# Patient Record
Sex: MALE | Race: BLACK OR AFRICAN AMERICAN | NOT HISPANIC OR LATINO | Employment: OTHER | URBAN - METROPOLITAN AREA
[De-identification: names, ages, dates, MRNs, and addresses within clinical notes are randomized per-mention and may not be internally consistent; named-entity substitution may affect disease eponyms.]

---

## 2023-03-17 ENCOUNTER — REFERRAL (OUTPATIENT)
Dept: TRANSPLANT | Facility: CLINIC | Age: 58
End: 2023-03-17

## 2023-03-17 DIAGNOSIS — K70.31 ALCOHOLIC CIRRHOSIS OF LIVER WITH ASCITES (H): Primary | ICD-10-CM

## 2023-03-17 NOTE — LETTER
January 30, 2024        Jose Dillon Sr.  [address]  [place] [address]        Dear Mr. Dillon,      We recently received your referral for liver transplant consideration.  You were scheduled for consultation with one of our providers today and were unable to reach you during your scheduled appointment time.        If you are interested in rescheduling an appointment with one of our providers, you can contact the clinic scheduling at [phone].  We will close this referral at this time.  If you have any questions, you can contact me at the number below.  Thank you.          Sincerely,       Danika Segovia, RN, BSN  Pre-Liver Transplant Coordinator  Phone: [phone]       CC:Referring Provider

## 2023-03-17 NOTE — LETTER
April 11, 2023      Jose Dillon Sr.  [address]  [place] [address]          Dear Jose,    Thank you for your interest in the Transplant Center at [facility]. We look forward to being a part of your care team and assisting you through the transplant process.    As we discussed, your transplant coordinator is Caitlin Segovia (Liver).  You may call your coordinator at any time with questions or concerns.  Your first scheduled call will be on April 17, 2023 between 10:00 am and 1:00 pm.  If this needs to change, call [phone].    Please complete the following.    Fill out and return the enclosed forms  Authorization for Electronic Communication  Authorization to Discuss Protected Health Information  Authorization for Release of Protected Health Information    Sign up for:  HipGeot, access to your electronic medical record (see enclosed pamphlet)  Contents FirstplantCrowdbooster, a transplant education website    You can use these tools to learn more about your transplant, communicate with your care team, and track your medical details      Sincerely,      Solid Organ Transplant  [facility]    cc: Referring Physician PCP

## 2023-03-17 NOTE — LETTER
April 11, 2023        Jose Dillon Sr.  [address]  [place] [address]        Dear Jose,    Thank you for your interest in the Transplant Center at [facility]. We look forward to being a part of your care team and assisting you through the transplant process.    As we discussed, your transplant coordinator is Caitlin Segovia (Liver).  You may call your coordinator at any time with questions or concerns.  Your first scheduled call will be on April 17, 2023 between 10:00 am and 1:00 pm.  If this needs to change, call [phone].    Please complete the following.    Fill out and return the enclosed forms  Authorization for Electronic Communication  Authorization to Discuss Protected Health Information  Authorization for Release of Protected Health Information    Sign up for:  Osmosist, access to your electronic medical record (see enclosed pamphlet)  [employer] MEDICAL[employer]mParticle, a transplant education website    You can use these tools to learn more about your transplant, communicate with your care team, and track your medical details      Sincerely,      Solid Organ Transplant  [facility]    cc: Referring Physician PCP

## 2023-03-17 NOTE — LETTER
PHYSICIAN ORDERS      DATE & TIME ISSUED: May 30, 2023 2:02 PM  PATIENT NAME: Jose Dillon Sr.   : 1965     [facility] MR# : [id number]     DIAGNOSIS:  CIrrhosis  ICD-10 CODE: K 70.31  Orders  1 year after issue.    These labs must be drawn all together on the same day when done:   INR   Basic Metabolic Panel   Hepatic Panel        Phosphatidyethanol (PEth)    CBC with platelets     PLEASE FAX RESULTS AS SOON AS POSSIBLE TO [phone], ATTN:LabDE    FOR CLINICAL QUESTIONS, PLEASE CALL Danika Segovia RN, at [phone]    .  Hepatology/Liver Transplant  Medical Director, Liver Transplantation  [facility]

## 2023-03-29 NOTE — TELEPHONE ENCOUNTER
Patient Call: Voicemail  Date/Time: 3/28/2023  [id number]  Reason for call: Pt returning call to Becky to start process

## 2023-04-11 VITALS — HEIGHT: 69 IN | BODY MASS INDEX: 25.77 KG/M2 | WEIGHT: 174 LBS

## 2023-04-11 NOTE — TELEPHONE ENCOUNTER
Patient was asked the following questions during liver intake call.     Referring Provider: STEFANO Nguyễn  Referring Diagnosis: Decompensated alcoholic cirrhosis of liver  PCP: Jeb Lawrence    1)Do you know why you have liver disease: yes       If Alcoholic Cirrhosis is present when was your last drink: 4-5 months ago       Have you ever been through treatment for alcohol: yes, inpatient x 2, can not remember name, was on [address]  2) Presence of Ascites: yes Paracentesis: yes  3) Presence of Hepatic Encephalopathy: yes Medications: yes lactulose, Xifixan  4) History of GI Bleeding: no  5) Oxygen Use: no  6) EGD: In CE  7) Colonoscopy: In CE   8) MELD Na Score: 17   9) Labs available for review from PCP/GI: In CE  10)HCC Diagnosis: no                                  11)Insurance information:   [employer]              Policy Rose: self             Subscriber/Policy/ID Number: [id number]              Group Number: [id number]    Referral intake process completed.  Patient is aware that after financial approval is received, medical records will be requested.   Patient confirmed for a callback from transplant coordinator on April 17, 2023.  Tentative evaluation date TBD.    Confirmed coordinator will discuss evaluation process in more detail at the time of their call.   Patient is aware of the need to arrange age appropriate cancer screening, vaccinations, and dental care.  Reminded patient to complete questionnaire, complete medical records release, and review packet prior to evaluation visit .  Assessed patient for special needs (ie--wheelchair, assistance, guardian, and ):  legally blind, right eye  Patient instructed to call [phone] with questions.     Patient gave verbal consent during intake call to obtain medical records and documents outside of [facility]:  yes

## 2023-04-17 NOTE — TELEPHONE ENCOUNTER
Patient scheduled for intake call between 10a-1p today.  Called at noon, no answer - lvm with my direct number for patient to return call to complete referral call with transplant oordinator.

## 2023-04-25 ENCOUNTER — TELEPHONE (OUTPATIENT)
Dept: TRANSPLANT | Facility: CLINIC | Age: 58
End: 2023-04-25

## 2023-04-25 NOTE — TELEPHONE ENCOUNTER
Jose called, he was inpatient at [facility] Discharged Yesterday 04/24/2023.  He did not have his cell phone with.  He missed his phone call with the care coordinator

## 2023-05-18 ENCOUNTER — TELEPHONE (OUTPATIENT)
Dept: DERMATOLOGY | Facility: CLINIC | Age: 58
End: 2023-05-18
Payer: COMMERCIAL

## 2023-05-18 NOTE — TELEPHONE ENCOUNTER
Writer spoke with patient over the phone, patient stated he was in the hospital and couldn't hear me. Writer informed patient that his Sept 21st appointment will need to be reschedule due to Dr. Nava not being in clinic. Patient stated he can't r/s at this time and wants a call back. (Will call back in 2 business days)

## 2023-05-24 ENCOUNTER — TELEPHONE (OUTPATIENT)
Dept: TRANSPLANT | Facility: CLINIC | Age: 58
End: 2023-05-24
Payer: COMMERCIAL

## 2023-05-24 NOTE — TELEPHONE ENCOUNTER
Transplant Update 05/24/23 :  Topic:  Transplant Referral/Evaluation Status    Called patient to discuss status of referral and/or evaluation for liver transplant.      Coordinator was able to reach the patient: Yes    Plan for follow-up:  Intake call with coordinator on 5-30-23 @1300.  Patient has been hospitalized at [facility] since 5-16-23.  Chart reviewed and discussed with patient, he was admitted with GI bleed and decompensated liver disease.  He expects to be discharged within the next 1-2 days.  He would like to discuss options for care at [facility], with the liver transplant team.  Primary coordinator aware of update.  Patient verbalized understanding of the plan and agrees to call Transplant Coordinator with any further questions or concerns.

## 2023-05-26 ENCOUNTER — TELEPHONE (OUTPATIENT)
Dept: DERMATOLOGY | Facility: CLINIC | Age: 58
End: 2023-05-26
Payer: COMMERCIAL

## 2023-05-26 NOTE — TELEPHONE ENCOUNTER
Lvm my chart message for patient to rescheduled the following    Appointment type: New  Provider:   Rescheduled date: 9-21-23  Specialty phone number: [phone]    Additonal Notes: Provider cancel clinic

## 2023-05-30 NOTE — TELEPHONE ENCOUNTER
"LIVER DISEASE ETOH   REFERRING PROVIDER Luis Dumont  [facility]hon[facility]    PCP Elvis Lawrence   -----------------------------------------------------------------------------------------------------------------------------  MELD 26         ABO A       Recent admission for para, had EGD - hematemesis, melena, UGIB; PERLA, hypona+    - large hiatal hernia    First dx: cirrhosis over 10 years ago from lab work.  Started decompensating last year or two.  GI bleed, ascites    Treatment at [facility], Luis Dumont note notes last ETOH Oct 2022.     Significant BLE edema , having significant effects on mobility    Has CADI coordinator. Trying to find assisted living type situation- currently has 28 stairs and very difficult to getting up and down, difficulty getting even around stud apartment, has been difficult for past few months        Ascites  Antonio Furosemide- currently off due to PERLA; acute on chronic creatinine elevation  Jamar approx once a month, 5 L approx   TIPS no  HE on lactulose and rifaximin     Variceal screening Last EGD recent admission, has upcoming repeat at [facility] pending    Alcohol use    Last ETOH at least 8 months. Prior 1/2-1 pint a day most days, lots of beer. Asked patient about having a beer on his birthday, noted on most recent admission.  \"I forgot about that.  It tasted nasty.  It was only one beer.\"     Was in program with random drug and alcohol testing- was on probation for DWI;  Was in treatment about a year ago through court system.  Just had CD evaluation about a month ago. They recommended nothing.      Discussed need for CD eval and treamtnet  Says he would never go back to certain places \"because they want to keep you inpatient\"      2 past treatment programs- [address], [facility]    ---------------------------------------------------------------------------------------------------------------------------------  PMH  Cardiac- denies  Pulm- denies issues; non smoker  Diabetes " no, but strong family hx  Abdominal surgery -none in abdomen;  small lipomas from neck removed  CRC Screening- done Oct 2022  Vaccinations:      SUKHJINDER  Lives by self.  No family in area, has friends that support    ---------------------------------------------------------------------------------------------------------------------------------  LDLT Discussed    PLAN    Had recent slip with ETOH use, 2 different treatment programs, and court ordered probation and testing after probation on DUI    Probable consult pending updated MELD labs and PEth - seeing PCP and will get labs then figure out timing; likely consults with Hep and MASSIMO CARVRE

## 2023-06-26 NOTE — TELEPHONE ENCOUNTER
Patient with updated lab 6/9/23    MELD 10    Orders placed for hepatology consult- GI  referral.  Should get MELD labs and PEth prior to consult

## 2023-06-27 NOTE — CONFIDENTIAL NOTE
DIAGNOSIS:  Alcoholic cirrhosis of liver with ascites    Appt Date: 07.17.2023   NOTES STATUS DETAILS   OFFICE NOTE from referring provider Internal 03.17.2023 Moira Blackman MD   OFFICE NOTES from other specialists Care Everywhere 06.09.2023 Luis Dumont PA-C [facility]   DISCHARGE SUMMARY from hospital Care Everywhere 02.27.2023 [facility]   MEDICATION LIST Care Everywhere    LIVER BIOSPY (IF APPLICABLE)      PATHOLOGY REPORTS      IMAGING     ENDOSCOPY (IF AVAILABLE) Care Everywhere 05.19.2023   COLONOSCOPY (IF AVAILABLE) Care Everywhere 11.18.2022   ULTRASOUND LIVER Care Everywhere  01.17.2023, 07.22.2022 Ult Abd Complete   CT OF ABDOMEN Care Everywhere 06.28.2023 CT Abd Pelvis   MRI OF LIVER     FIBROSCAN, US ELASTOGRAPHY, FIBROSIS SCAN, MR ELASTOGRAPHY     LABS     HEPATIC PANEL (LIVER PANEL) Care Everywhere 06.09.2023   BASIC METABOLIC PANEL Care Everywhere 06.09.2023   COMPLETE METABOLIC PANEL Care Everywhere 05.25.2023   COMPLETE BLOOD COUNT (CBC) Care Everywhere 05.25.2023   INTERNATIONAL NORMALIZED RATIO (INR) Care Everywhere 06.09.2023   HEPATITIS C ANTIBODY     HEPATITIS C VIRAL LOAD/PCR     HEPATITIS C GENOTYPE     HEPATITIS B SURFACE ANTIGEN     HEPATITIS B SURFACE ANTIBODY     HEPATITIS B DNA QUANT LEVEL     HEPATITIS B CORE ANTIBODY       Action 06.27.2023 QRM   Action Taken Pending images from [facility]     Action 07.10.2023 RM   Action Taken Images received and uploaded to chart.

## 2023-07-11 DIAGNOSIS — K70.31 ALCOHOLIC CIRRHOSIS OF LIVER WITH ASCITES (H): Primary | ICD-10-CM

## 2023-07-17 ENCOUNTER — PRE VISIT (OUTPATIENT)
Dept: GASTROENTEROLOGY | Facility: CLINIC | Age: 58
End: 2023-07-17

## 2023-07-17 ENCOUNTER — OFFICE VISIT (OUTPATIENT)
Dept: GASTROENTEROLOGY | Facility: CLINIC | Age: 58
End: 2023-07-17
Attending: INTERNAL MEDICINE
Payer: COMMERCIAL

## 2023-07-17 ENCOUNTER — LAB (OUTPATIENT)
Dept: LAB | Facility: CLINIC | Age: 58
End: 2023-07-17
Payer: COMMERCIAL

## 2023-07-17 VITALS
WEIGHT: 203.7 LBS | BODY MASS INDEX: 30.08 KG/M2 | SYSTOLIC BLOOD PRESSURE: 138 MMHG | OXYGEN SATURATION: 100 % | HEART RATE: 83 BPM | DIASTOLIC BLOOD PRESSURE: 86 MMHG | TEMPERATURE: 98.1 F

## 2023-07-17 DIAGNOSIS — K70.31 ALCOHOLIC CIRRHOSIS OF LIVER WITH ASCITES (H): Primary | ICD-10-CM

## 2023-07-17 DIAGNOSIS — K70.31 ALCOHOLIC CIRRHOSIS OF LIVER WITH ASCITES (H): ICD-10-CM

## 2023-07-17 LAB
ALBUMIN SERPL BCG-MCNC: 3.1 G/DL (ref 3.5–5.2)
ALP SERPL-CCNC: 142 U/L (ref 40–129)
ALT SERPL W P-5'-P-CCNC: 29 U/L (ref 0–70)
ANION GAP SERPL CALCULATED.3IONS-SCNC: 8 MMOL/L (ref 7–15)
AST SERPL W P-5'-P-CCNC: 62 U/L (ref 0–45)
BILIRUB DIRECT SERPL-MCNC: 0.64 MG/DL (ref 0–0.3)
BILIRUB SERPL-MCNC: 1.1 MG/DL
BUN SERPL-MCNC: 23 MG/DL (ref 6–20)
CALCIUM SERPL-MCNC: 8.4 MG/DL (ref 8.6–10)
CHLORIDE SERPL-SCNC: 107 MMOL/L (ref 98–107)
CREAT SERPL-MCNC: 1.01 MG/DL (ref 0.67–1.17)
DEPRECATED HCO3 PLAS-SCNC: 25 MMOL/L (ref 22–29)
ERYTHROCYTE [DISTWIDTH] IN BLOOD BY AUTOMATED COUNT: 18.5 % (ref 10–15)
GFR SERPL CREATININE-BSD FRML MDRD: 87 ML/MIN/1.73M2
GLUCOSE SERPL-MCNC: 100 MG/DL (ref 70–99)
HBV CORE AB SERPL QL IA: NONREACTIVE
HCT VFR BLD AUTO: 25 % (ref 40–53)
HGB BLD-MCNC: 8.1 G/DL (ref 13.3–17.7)
INR PPP: 1.35 (ref 0.85–1.15)
MCH RBC QN AUTO: 32.5 PG (ref 26.5–33)
MCHC RBC AUTO-ENTMCNC: 32.4 G/DL (ref 31.5–36.5)
MCV RBC AUTO: 100 FL (ref 78–100)
PLATELET # BLD AUTO: 140 10E3/UL (ref 150–450)
POTASSIUM SERPL-SCNC: 4.8 MMOL/L (ref 3.4–5.3)
PROT SERPL-MCNC: 5.6 G/DL (ref 6.4–8.3)
RBC # BLD AUTO: 2.49 10E6/UL (ref 4.4–5.9)
SODIUM SERPL-SCNC: 140 MMOL/L (ref 136–145)
WBC # BLD AUTO: 5.3 10E3/UL (ref 4–11)

## 2023-07-17 PROCEDURE — 82248 BILIRUBIN DIRECT: CPT | Performed by: PATHOLOGY

## 2023-07-17 PROCEDURE — 99205 OFFICE O/P NEW HI 60 MIN: CPT | Performed by: PHYSICIAN ASSISTANT

## 2023-07-17 PROCEDURE — 36415 COLL VENOUS BLD VENIPUNCTURE: CPT | Performed by: PATHOLOGY

## 2023-07-17 PROCEDURE — 86704 HEP B CORE ANTIBODY TOTAL: CPT | Performed by: PHYSICIAN ASSISTANT

## 2023-07-17 PROCEDURE — G0463 HOSPITAL OUTPT CLINIC VISIT: HCPCS | Performed by: PHYSICIAN ASSISTANT

## 2023-07-17 PROCEDURE — 85027 COMPLETE CBC AUTOMATED: CPT | Performed by: PATHOLOGY

## 2023-07-17 PROCEDURE — 99000 SPECIMEN HANDLING OFFICE-LAB: CPT | Performed by: PATHOLOGY

## 2023-07-17 PROCEDURE — 80053 COMPREHEN METABOLIC PANEL: CPT | Performed by: PATHOLOGY

## 2023-07-17 PROCEDURE — 85610 PROTHROMBIN TIME: CPT | Performed by: PATHOLOGY

## 2023-07-17 RX ORDER — LANOLIN ALCOHOL/MO/W.PET/CERES
100 CREAM (GRAM) TOPICAL DAILY
COMMUNITY

## 2023-07-17 RX ORDER — FLUTICASONE PROPIONATE 50 MCG
1 SPRAY, SUSPENSION (ML) NASAL DAILY
COMMUNITY

## 2023-07-17 RX ORDER — LACTULOSE 10 G/15ML
20 SOLUTION ORAL 3 TIMES DAILY
COMMUNITY

## 2023-07-17 RX ORDER — FOLIC ACID 1 MG/1
1 TABLET ORAL DAILY
COMMUNITY

## 2023-07-17 RX ORDER — CHOLECALCIFEROL (VITAMIN D3) 50 MCG
1 TABLET ORAL DAILY
COMMUNITY

## 2023-07-17 ASSESSMENT — PAIN SCALES - GENERAL: PAINLEVEL: EXTREME PAIN (9)

## 2023-07-17 NOTE — LETTER
7/17/2023         RE: Jose Dillon Sr.  [address]  [place] [address]        Dear Colleague,    Thank you for referring your patient, Jose Dillon Sr., to the [facility] [place]. Please see a copy of my visit note below.    Hepatology Clinic note  Jose Dillon Sr.   Date of Birth 1965  Date of Service 7/16/2023    REASON FOR CONSULTATION: ETOH cirrhosis   REFERRING PROVIDER: Luis Dumont         Assessment/plan:   Jose Dillon Sr. is a 58 year old male with EtOH cirrhosis complicated by history of esophageal varices, hepatic encephalopathy well-controlled with current regimen and ascites requiring paracentesis monthly. Last ETOH use was around April 2023.     Over the past month, his liver and kidney function have improved quite a bit. MELD 3.0 today is 10 (MELD-Na June was 25). Today we discussed natural history of cirrhosis and complications of the disease as well as indications for liver transplantation.  Given improvement in his liver function, discussed that liver transplantation evaluation is not indicated at this time. For recurrent ascites, we briefly discussed TIPs procedure if he continues to require paracentesis. He will continue routine follow up with liver provider.  He is up-to-date for variceal and HCC screening.    #History of ascites:  - Continue paracentesis. Highly recommended going in for weekly paracentesis.  - If he continues to have regular paracentesis, discussed risk/benefits of TIPS procedure.   - Will defer restarting diuretics [facility] liver provider.  Will defer restarting diuretics .     # HCC Screening:   - Up to date    # Variceal screening:   - Up to date    # Continue planned follow up with [facility] Hepatology provider   # Follow up with [facility] Hepatology as needed in future if liver function worsens       Mary Dubon PA-C   [facility]     Total time for  E/M services performed on the date of the encounter 60 minutes.  This included review of previous: clinic visits, hospital records, lab results, imaging studies, and procedural documentation. Time also includes patient visit, documentation and discussion with other providers.  The findings from this review are summarized in the above note.   ----------------------------------------------------       HPI:   Jose Dillon Sr. is a 58 year old male  presenting for the evaluation of elevated LFT's.     ETOH cirrhosis   Complicated by:   - Ascites  - HE   EGD: 6/30/2023    Patient was diagnosed with cirrhosis 2011. He has been closely by [facility] hepatology over the past year and a half, last saw Luis Dumont June 2023.  He was hospitalized in April 2023 for acute kidney injury, mild hyponatremia.  He has been off diuretics months.    First paracentesis was about April 2022.  Over the past few months, he has been having beverly about once a month, last paracentesis was 11 L out.  He has been recommended to have weekly paracentesis, but states he could not get in the habit of doing it weekly.     Appetite is very good. Weight 190 - 217 lbs. Limiting processed foods following low-sodium diet.     Using a cane to help ambulate. History of chronic pain. Burning/ right leg radiation- gabapentin. Happens all day every day    Hospitalized in February 20 23-year for hepatic encephalopathy at which time he is not taking lactulose and also notes that he was dehydrated and not eating well.  Currently he is taking lactulose - 30 mL - have a good 3 bowel movements.  No problems with confusion if he takes lactulose regularly.  Also taking rifaximin 550 twice daily.    Patient also denies melena, hematochezia or hematemesis. Patient denies , fevers, sweats or chills.    PMH:   Madelung's disease (Launois-Bensaude disease, multiple symmetrical lipomatosis, ETOH cirrhosis     SMH:    has no past surgical history on file.      Medications:   This patient does not have an active medication from one of the medication groupers.     No smoking. Last drank alcohol a few months (around birthday/ ). A few pints a week. Cannabis - pain. Did have a CD assessment within the past few months. He has done a few court ordered CD treatment in the past. Last CD treatment 2-3 years ago. No previous IV/IN drug use. Not currently. Patient currently lives by self. Possible get into a 1st floor Apt. Brother with alcohol use disorder.     Previous work-up:  Hepatitis A IgG nonreactive  Hepatitis B surface antigen nonreactive  Hepatitis B surface antibody reactive  Hepatitis C antibody nonreactive   HFE mutation -all negative  Ferritin 540  Iron sat percentage 48%  Smooth muscle antibody 14-normal  Mitochondrial antibody 2  A1AT - 122  IGG - 1703  Ceruloplasmin 25.7 -        Allergies:     Allergies   Allergen Reactions    Ace Inhibitors Cough     Other reaction(s): Cough  Dry cough that resolved with stopping lisinopril              Social History:     Social History     Socioeconomic History    Marital status: Single     Spouse name: Not on file    Number of children: Not on file    Years of education: Not on file    Highest education level: Not on file   Occupational History    Not on file   Tobacco Use    Smoking status: Never    Smokeless tobacco: Never   Substance and Sexual Activity    Alcohol use: Not Currently     Comment: quit ETOH 4-5 months ago    Drug use: Never    Sexual activity: Not on file   Other Topics Concern    Not on file   Social History Narrative    Not on file     Social Determinants of Health     Financial Resource Strain: Not on file   Food Insecurity: Not on file   Transportation Needs: Not on file   Physical Activity: Not on file   Stress: Not on file   Social Connections: Not on file   Intimate Partner Violence: Not on file   Housing Stability: Not on file            Family History:   No family history on file.         Review  of Systems:   Gen: See HPI     HEENT: No change in vision or hearing, mouth sores, dysphagia, lymph nodes  Resp: No shortness of breath, coughing, hx of asthma  CV: No chest pain, palpitations, syncope   GI: See HPI  : No dysuria, history of stones, urine color    Skin: No rash; no pruritus or psoriasis  MS: No arthralgias, myalgias, joint swelling  Neuro: No memory changes, confusion, numbness    Heme: No difficulty clotting, bruising, bleeding  Psych:  No anxiety, depression, agitation          Physical Exam:   /86 (BP Location: Left arm, Patient Position: Sitting, Cuff Size: Adult Regular)   Pulse 83   Temp 98.1  F (36.7  C) (Oral)   Wt 92.4 kg (203 lb 11.2 oz)   SpO2 100%   BMI 30.08 kg/m      Gen: A&Ox3, NAD, peripheal muscle wasting  HEENT: non-icteric  CV: RRR, no overt murmurs  Lung: CTA Bilatererally, no wheezing or crackles.   Lym- no palpable lymphadenopathy  Abd: soft, NT, ND, moderate ascites  Ext: no edema, intact pulses.   Skin: No rash, no palmar erythema, telangiectasias or jaundice  Neuro: grossly intact, no asterixis   Psych: appropriate mood and affects         Data:   Reviewed in person and significant for:    Last Comprehensive Metabolic Panel:  Lab Results   Component Value Date     07/17/2023    POTASSIUM 4.8 07/17/2023    CHLORIDE 107 07/17/2023    CO2 25 07/17/2023    ANIONGAP 8 07/17/2023     (H) 07/17/2023    BUN 23.0 (H) 07/17/2023    CR 1.01 07/17/2023    GFRESTIMATED 87 07/17/2023    GAVIN 8.4 (L) 07/17/2023     Lab Results   Component Value Date    AST 62 07/17/2023     Lab Results   Component Value Date    ALT 29 07/17/2023     No results found for: BILICONJ   Lab Results   Component Value Date    BILITOTAL 1.1 07/17/2023     Lab Results   Component Value Date    ALBUMIN 3.1 07/17/2023     Lab Results   Component Value Date    PROTTOTAL 5.6 07/17/2023      Lab Results   Component Value Date    ALKPHOS 142 07/17/2023     Lab Results   Component Value Date     WBC 5.3 07/17/2023     Lab Results   Component Value Date    RBC 2.49 07/17/2023     Lab Results   Component Value Date    HGB 8.1 07/17/2023     Lab Results   Component Value Date    HCT 25.0 07/17/2023     No components found for: MCT  Lab Results   Component Value Date     07/17/2023     Lab Results   Component Value Date    MCH 32.5 07/17/2023     Lab Results   Component Value Date    MCHC 32.4 07/17/2023     Lab Results   Component Value Date    RDW 18.5 07/17/2023     Lab Results   Component Value Date     07/17/2023           Imaging:      Exam: Abdomen/pelvis CT with contrast 6/29/2023 12:04 AM     Indication: painful abdominal hernia, hx cirrhosis with bloody vomit       Comparison: Several prior CTs, most recent dated 4/20/2023. Several prior abdominal ultrasounds, most recent dated 1/17/2023.     Technique: Volumetric helical acquisition of CT images from the lung bases through the symphysis pubis with administration of intravenous contrast. Images were reconstructed in axial, coronal, and sagittal planes and reviewed in lung, bone, and soft tissue windows.     Dose: Total DLP = 893 mGy.cm.       Findings:     Abdomen greatly distended by ascites. Large esophageal varices.     Liver: Cirrhosis. No focal hepatic lesion.     Gallbladder and biliary tree: small layering gallstones. No intra- or extrahepatic biliary ductal dilation.     Pancreas: Atrophic.     Spleen: Within normal limits.     Adrenals: Within normal limits.     Kidneys, ureters and bladder: Within normal limits.     Reproductive organs: Normal prostate.     Bowel: Moderate sliding hiatal hernia with surrounding paraesophageal varices. No convincing active extravasation of contrast. Normal caliber small bowel and large bowel. Mild thickening of the right hemicolon likely due to cirrhotic colopathy.     Lymph nodes: Mild inguinal adenopathy is likely reactive.     Peritoneum: Large volume ascites. Mild hazy attenuation of the  mesentery, likely edematous in nature.     Vessels: Aorta and major branches are patent without aneurysm or significant stenosis. Portal vein and superior mesenteric vein are patent. Aortoiliac calcifications. Recannulized paraumbilical vein.     Bones/soft tissues: No acute or suspicious osseous abnormality. Umbilical hernia contains fat and ascites. Anasarca.     Lung bases: Coronary artery atherosclerosis. Lungs are clear.      Again, thank you for allowing me to participate in the care of your patient.        Sincerely,        Mary Dubon PA-C

## 2023-07-17 NOTE — PROGRESS NOTES
Hepatology Clinic note  Jose Dillon Sr.   Date of Birth 1965  Date of Service 7/16/2023    REASON FOR CONSULTATION: ETOH cirrhosis   REFERRING PROVIDER: Luis Dumont         Assessment/plan:   Jose Dillon Sr. is a 58 year old male with EtOH cirrhosis complicated by history of esophageal varices, hepatic encephalopathy well-controlled with current regimen and ascites requiring paracentesis monthly. Last ETOH use was around April 2023.     Over the past month, his liver and kidney function have improved quite a bit. MELD 3.0 today is 10 (MELD-Na June was 25). Today we discussed natural history of cirrhosis and complications of the disease as well as indications for liver transplantation.  Given improvement in his liver function, discussed that liver transplantation evaluation is not indicated at this time. For recurrent ascites, we briefly discussed TIPs procedure if he continues to require paracentesis. He will continue routine follow up with liver provider.  He is up-to-date for variceal and HCC screening.    #History of ascites:  - Continue paracentesis. Highly recommended going in for weekly paracentesis.  - If he continues to have regular paracentesis, discussed risk/benefits of TIPS procedure.   - Will defer restarting diuretics [facility] liver provider.  Will defer restarting diuretics .     # HCC Screening:   - Up to date    # Variceal screening:   - Up to date    # Continue planned follow up with [facility] Hepatology provider   # Follow up with [facility] Hepatology as needed in future if liver function worsens       Mary Dubon PA-C   [facility] Hepatology     Total time for E/M services performed on the date of the encounter 60 minutes.  This included review of previous: clinic visits, hospital records, lab results, imaging studies, and procedural documentation. Time also includes patient visit, documentation and discussion with other providers.  The findings from  this review are summarized in the above note.   ----------------------------------------------------       HPI:   Joes Dillon Sr. is a 58 year old male  presenting for the evaluation of elevated LFT's.     ETOH cirrhosis   Complicated by:   - Ascites  - HE   EGD: 6/30/2023    Patient was diagnosed with cirrhosis 2011. He has been closely by [facility] over the past year and a half, last saw Luis Dumont June 2023.  He was hospitalized in April 2023 for acute kidney injury, mild hyponatremia.  He has been off diuretics months.    First paracentesis was about April 2022.  Over the past few months, he has been having beverly about once a month, last paracentesis was 11 L out.  He has been recommended to have weekly paracentesis, but states he could not get in the habit of doing it weekly.     Appetite is very good. Weight 190 - 217 lbs. Limiting processed foods following low-sodium diet.     Using a cane to help ambulate. History of chronic pain. Burning/ right leg radiation- gabapentin. Happens all day every day    Hospitalized in February 20 23-year for hepatic encephalopathy at which time he is not taking lactulose and also notes that he was dehydrated and not eating well.  Currently he is taking lactulose - 30 mL - have a good 3 bowel movements.  No problems with confusion if he takes lactulose regularly.  Also taking rifaximin 550 twice daily.    Patient also denies melena, hematochezia or hematemesis. Patient denies , fevers, sweats or chills.    PMH:   Madelung's disease (Launois-Bensaude disease, multiple symmetrical lipomatosis, ETOH cirrhosis     SMH:    has no past surgical history on file.     Medications:   This patient does not have an active medication from one of the medication groupers.     No smoking. Last drank alcohol a few months (around birthday/ ). A few pints a week. Cannabis - pain. Did have a CD assessment within the past few months. He has done a few court ordered CD treatment in  the past. Last CD treatment 2-3 years ago. No previous IV/IN drug use. Not currently. Patient currently lives by self. Possible get into a 1st floor Apt. Brother with alcohol use disorder.     Previous work-up:  Hepatitis A IgG nonreactive  Hepatitis B surface antigen nonreactive  Hepatitis B surface antibody reactive  Hepatitis C antibody nonreactive   HFE mutation -all negative  Ferritin 540  Iron sat percentage 48%  Smooth muscle antibody 14-normal  Mitochondrial antibody 2  A1AT - 122  IGG - 1703  Ceruloplasmin 25.7 -        Allergies:     Allergies   Allergen Reactions    Ace Inhibitors Cough     Other reaction(s): Cough  Dry cough that resolved with stopping lisinopril              Social History:     Social History     Socioeconomic History    Marital status: Single     Spouse name: Not on file    Number of children: Not on file    Years of education: Not on file    Highest education level: Not on file   Occupational History    Not on file   Tobacco Use    Smoking status: Never    Smokeless tobacco: Never   Substance and Sexual Activity    Alcohol use: Not Currently     Comment: quit ETOH 4-5 months ago    Drug use: Never    Sexual activity: Not on file   Other Topics Concern    Not on file   Social History Narrative    Not on file     Social Determinants of Health     Financial Resource Strain: Not on file   Food Insecurity: Not on file   Transportation Needs: Not on file   Physical Activity: Not on file   Stress: Not on file   Social Connections: Not on file   Intimate Partner Violence: Not on file   Housing Stability: Not on file            Family History:   No family history on file.         Review of Systems:   Gen: See HPI     HEENT: No change in vision or hearing, mouth sores, dysphagia, lymph nodes  Resp: No shortness of breath, coughing, hx of asthma  CV: No chest pain, palpitations, syncope   GI: See HPI  : No dysuria, history of stones, urine color    Skin: No rash; no pruritus or psoriasis  MS:  No arthralgias, myalgias, joint swelling  Neuro: No memory changes, confusion, numbness    Heme: No difficulty clotting, bruising, bleeding  Psych:  No anxiety, depression, agitation          Physical Exam:   /86 (BP Location: Left arm, Patient Position: Sitting, Cuff Size: Adult Regular)   Pulse 83   Temp 98.1  F (36.7  C) (Oral)   Wt 92.4 kg (203 lb 11.2 oz)   SpO2 100%   BMI 30.08 kg/m      Gen: A&Ox3, NAD, peripheal muscle wasting  HEENT: non-icteric  CV: RRR, no overt murmurs  Lung: CTA Bilatererally, no wheezing or crackles.   Lym- no palpable lymphadenopathy  Abd: soft, NT, ND, moderate ascites  Ext: no edema, intact pulses.   Skin: No rash, no palmar erythema, telangiectasias or jaundice  Neuro: grossly intact, no asterixis   Psych: appropriate mood and affects         Data:   Reviewed in person and significant for:    Last Comprehensive Metabolic Panel:  Lab Results   Component Value Date     07/17/2023    POTASSIUM 4.8 07/17/2023    CHLORIDE 107 07/17/2023    CO2 25 07/17/2023    ANIONGAP 8 07/17/2023     (H) 07/17/2023    BUN 23.0 (H) 07/17/2023    CR 1.01 07/17/2023    GFRESTIMATED 87 07/17/2023    GAVIN 8.4 (L) 07/17/2023     Lab Results   Component Value Date    AST 62 07/17/2023     Lab Results   Component Value Date    ALT 29 07/17/2023     No results found for: BILICONJ   Lab Results   Component Value Date    BILITOTAL 1.1 07/17/2023     Lab Results   Component Value Date    ALBUMIN 3.1 07/17/2023     Lab Results   Component Value Date    PROTTOTAL 5.6 07/17/2023      Lab Results   Component Value Date    ALKPHOS 142 07/17/2023     Lab Results   Component Value Date    WBC 5.3 07/17/2023     Lab Results   Component Value Date    RBC 2.49 07/17/2023     Lab Results   Component Value Date    HGB 8.1 07/17/2023     Lab Results   Component Value Date    HCT 25.0 07/17/2023     No components found for: MCT  Lab Results   Component Value Date     07/17/2023     Lab Results    Component Value Date    MCH 32.5 07/17/2023     Lab Results   Component Value Date    MCHC 32.4 07/17/2023     Lab Results   Component Value Date    RDW 18.5 07/17/2023     Lab Results   Component Value Date     07/17/2023           Imaging:      Exam: Abdomen/pelvis CT with contrast 6/29/2023 12:04 AM     Indication: painful abdominal hernia, hx cirrhosis with bloody vomit       Comparison: Several prior CTs, most recent dated 4/20/2023. Several prior abdominal ultrasounds, most recent dated 1/17/2023.     Technique: Volumetric helical acquisition of CT images from the lung bases through the symphysis pubis with administration of intravenous contrast. Images were reconstructed in axial, coronal, and sagittal planes and reviewed in lung, bone, and soft tissue windows.     Dose: Total DLP = 893 mGy.cm.       Findings:     Abdomen greatly distended by ascites. Large esophageal varices.     Liver: Cirrhosis. No focal hepatic lesion.     Gallbladder and biliary tree: small layering gallstones. No intra- or extrahepatic biliary ductal dilation.     Pancreas: Atrophic.     Spleen: Within normal limits.     Adrenals: Within normal limits.     Kidneys, ureters and bladder: Within normal limits.     Reproductive organs: Normal prostate.     Bowel: Moderate sliding hiatal hernia with surrounding paraesophageal varices. No convincing active extravasation of contrast. Normal caliber small bowel and large bowel. Mild thickening of the right hemicolon likely due to cirrhotic colopathy.     Lymph nodes: Mild inguinal adenopathy is likely reactive.     Peritoneum: Large volume ascites. Mild hazy attenuation of the mesentery, likely edematous in nature.     Vessels: Aorta and major branches are patent without aneurysm or significant stenosis. Portal vein and superior mesenteric vein are patent. Aortoiliac calcifications. Recannulized paraumbilical vein.     Bones/soft tissues: No acute or suspicious osseous abnormality.  Umbilical hernia contains fat and ascites. Anasarca.     Lung bases: Coronary artery atherosclerosis. Lungs are clear.

## 2023-07-17 NOTE — NURSING NOTE
Chief Complaint   Patient presents with     Consult    /86 (BP Location: Left arm, Patient Position: Sitting, Cuff Size: Adult Regular)   Pulse 83   Temp 98.1  F (36.7  C) (Oral)   Wt 92.4 kg (203 lb 11.2 oz)   SpO2 100%   BMI 30.08 kg/m

## 2023-12-12 ENCOUNTER — DOCUMENTATION ONLY (OUTPATIENT)
Dept: TRANSPLANT | Facility: CLINIC | Age: 58
End: 2023-12-12
Payer: COMMERCIAL

## 2023-12-12 ENCOUNTER — REFERRAL (OUTPATIENT)
Dept: TRANSPLANT | Facility: CLINIC | Age: 58
End: 2023-12-12

## 2023-12-12 VITALS — HEIGHT: 69 IN | WEIGHT: 178.8 LBS | BODY MASS INDEX: 26.48 KG/M2

## 2023-12-12 DIAGNOSIS — K70.31 ALCOHOLIC CIRRHOSIS OF LIVER WITH ASCITES (H): Primary | ICD-10-CM

## 2023-12-12 NOTE — LETTER
PHYSICIAN ORDERS      DATE & TIME ISSUED: December 15, 2023 10:59 AM  PATIENT NAME: Jose Dillon Sr.   : 1965     [facility] MR# : [id number]     DIAGNOSIS:  Cirrhosis   ICD-10 CODE: K70.31  Orders  1 year after issue.    Please have the following labs done when at facility for paracentesis. These labs must be drawn all together on the same day when done:   INR   CMP        Phosphatidyethanol (PEth)      PLEASE FAX RESULTS AS SOON AS POSSIBLE TO [phone], ATTN:LabDE    FOR CLINICAL QUESTIONS, PLEASE CALL Danika Segovia, [phone]    .  Hepatology/Liver Transplant  Medical Director, Liver Transplantation  [facility]

## 2023-12-12 NOTE — Clinical Note
Need to open new referral for him, please proceed with intake.  Luis Dumont at [facility] is referring Thanks

## 2023-12-12 NOTE — TELEPHONE ENCOUNTER
Patient was asked the following questions during liver intake call.      Referring Provider: Dr. Luis Dumont  Referring Diagnosis: Alcoholic Cirrhosis  PCP:  Jeb Lawrence     1)Do you know why you have liver disease: Yes, Alcohol      If Alcoholic Cirrhosis is present when was your last drink:  Pt reported approximately Nov 2022      Have you ever been through treatment for alcohol: court ordered tx x2  2) Presence of Ascites: Yes Paracentesis: Yes  3) Presence of Hepatic Encephalopathy: Yes  Medications: Lactulose daily  4) History of GI Bleeding: yes  5) Oxygen Use: NA  6) EGD: 8/9/23, 6/30/23  7) Colonoscopy: In Care Everywhere 11/18/22  8) MELD Score: 18  9)  Labs available for review from PCP/GI:  In Care Everywhere  10)HCC Diagnosis: No                                         11)Insurance information:              Policy Rose: [employer]             Subscriber/Policy/ID Number: [id number]              Group Number: [id number]     Pt reports having a couple rotten teeth. Recommended pt schedule a dental appointment as soon as possible to start treatment recommendations.      Pt strongly declined mychart set-up.    Referral intake process completed.  Patient is aware that after financial approval is received, medical records will be requested.   Patient confirmed for a callback from transplant coordinator on 12/15/23.  Tentative evaluation date TBD.     Confirmed coordinator will discuss evaluation process in more detail at the time of their call.   Patient is aware of the need to arrange age appropriate cancer screening, vaccinations, and dental care.  Reminded patient to complete questionnaire, complete medical records release, and review packet prior to evaluation visit .  Assessed patient for special needs (ie--wheelchair, assistance, guardian, and ):     Patient instructed to call [phone] with questions. yes     Patient gave verbal consent during intake call to obtain medical records and  documents outside of [facility]:   yes

## 2023-12-12 NOTE — LETTER
Jose Dillon Sr.  [address]  [place] [address]          Dear Jose,    Thank you for your interest in the Transplant Center at [facility]. We look forward to being a part of your care team and assisting you through the transplant process.    As we discussed, your transplant coordinator is Caitlin Segovia (Liver).  You may call your coordinator at any time with questions or concerns.  Your first scheduled call will be on 12/15/23 between 9:30am - 11:30am. If this needs to change, call [phone].    Please complete the following.    Fill out and return the enclosed forms  Authorization for Electronic Communication  Authorization to Discuss Protected Health Information  Authorization for Release of Protected Health Information    Sign up for:  [employer], access to your electronic medical record (see enclosed pamphlet)  [employer], a transplant education website                                                             My Transplant Place     You can use these tools to learn more about your transplant, communicate with your care team, and track your medical details      Sincerely,      Solid Organ Transplant  [facility]    cc: Referring Physician PCP

## 2023-12-12 NOTE — PROGRESS NOTES
Call from Tamanna at [facility]. Patient previously seen in consult, referral closed due to MELD of 10.  However, patient developed SBP, MELD has now been 18-22 and Luis Dumnot would like to re-refer for transplant evaluation.  Order sent to intake

## 2023-12-13 ENCOUNTER — TELEPHONE (OUTPATIENT)
Dept: TRANSPLANT | Facility: CLINIC | Age: 58
End: 2023-12-13
Payer: COMMERCIAL

## 2023-12-15 NOTE — TELEPHONE ENCOUNTER
Seen here after referral in May/Rocio- see consult note with Mary and prior referral intake with me June 2023    Past GI bleed hx in May, hematemesis;  In ed last night, Hgb 6/7 in clinic and sent to ED,  but repeat was 7.1 so did not get transfused    Last EGD: 8/9/23    Recent admission for SBP, MELD was up to 18, now back down to 14 on yesterday's labs.       Last ETOH- says he can't pinpoint last once  Says no desire to drink alcohol.  Asked further, thinks maybe March last year.  Reminded him that he reported drinking a beer on his birthday May 2022 - noted during last referral call.  He said it tasted nasty then threw it out and none since.      Reports prior drank at home 1- 2 x week hard alcohol plus beer.     Denies all other drug use including marijuana; positive THC noted April 2021    Tim had advised him to stop drinking totally in past that he would die if he had any ETOH at all.  None since, with exception of beer on birthday May 2023    Issues with hernia concerning to him    Fluid had slowed down, but now frequent beverly again recently and with SBP episode      Declined para and CT in ED yesterday.      PLAN:  MELD < 15 now, will have him get labs again next week to determine if follow up vs eval based on results.  Will get labs when he is there for para next week and determine next steps from there.  Patient will call me 1-2 days after labs drawn and we will determine follow up plan

## 2023-12-30 NOTE — TELEPHONE ENCOUNTER
Most recent MELD =  12 on 12/29/23    PEth done 12/19/23 = 11    Given low MELD, will place order for Hepatology consult.  To be seen by MD this consult, not by mid level given need for determination on transplant candidacy and potential/need for hernia repair    Should have repeat labs (CMP, INR, PEth, CBC) prior to consult, should be in person    Also CD eval, SW consult , orders placed

## 2024-01-15 ENCOUNTER — VIRTUAL VISIT (OUTPATIENT)
Dept: TRANSPLANT | Facility: CLINIC | Age: 59
End: 2024-01-15
Attending: INTERNAL MEDICINE
Payer: COMMERCIAL

## 2024-01-15 DIAGNOSIS — K70.31 ALCOHOLIC CIRRHOSIS OF LIVER WITH ASCITES (H): ICD-10-CM

## 2024-01-15 NOTE — LETTER
1/15/2024         RE: Jose Dillon Sr.  [address]  [place] [address]        Dear Colleague,    Thank you for referring your patient, Jose Dillon Sr., to the [facility]ANSPL[facility]. Please see a copy of my visit note below.    Psychosocial Assessment - Consultation  Jose Dillon Sr. Participated in a telephone consultation for potential liver transplantation.   Living Situation: Jose lives alone in a studio apartment in [place], [place]. He's resided there for the past four years. He indicated that they have three flights of stairs to enter their apartment. He has his friend, Yoly assist him in the apartment.   Education/Employment:  Jose completed two years of vocational school. He last worked about 15 years ago. He is not longer working and is considered disabled.  Jose is not a .   Financial /Income: Jose receives supplemental security income. He does not have any financial concerns.   Health Insurance:  Jose has [employer] MA. This writer talked with Jose about the financial risks of transplant, particularly about the high cost of transplant related medications and the importance of maintaining adequate health insurance coverage.  Family/Social Support: Jose is not  and has one son, Jose who lives in [place]. He has two living sisters, Ayleen and Becky who live in [place]. His father Darryl lives in [place]. He reports a good relationship with his siblings and family, however they would not be able to be involved with his care post transplantation. Jose has a friend Yoly that assists with all his IADLS. She is in the process of becoming his official PCA.   His friend, Yoly would be his primary caregiver post transplantation. She was involved with the call, but further education should be provided if patient continues to transplant evaluation.   This writer stressed the importance of having a stable and involved support network  "before and after transplant.  Provided Jose  with education about the relationship between a stable support system and better surgical and post-transplant outcomes compared to patients with a limited support system.    Functional Status: Jose uses a cane to ambulate. His friend, Yoly assists with all other IADLs. He is not driving due to his DUIs. Both him and Yoly manage his medications.   Chemical Dependency:  Jose reports that his last consumption of alcohol was about a year ago, however there is reports of him taking a sip of alcohol on his birthday in May of 2023. He reports that prior to abstaining from alcohol, he was consuming a six pack of beer every four days. Per clinical, there are reports of him reporting six beers per day. He does not report a history of tobacco use, or misuse/over use of pain medication. Jose did not disclose any illicit substances, however per clinical he had reported some marijuana in the past.   Jose has a history of a \"few\" outpatient treatment programs. He could not provide a specific number. His treatment program ranged from 4-8 years ago. He also he a history of four DUIs, ranging from 1004-6234.   Today, Jose reported that he was diagnosed with liver disease one year ago, however per clinical it appears that he was diagnosed in 2011. At that time, he had a hospital admission for alcohol use and was provided chemical dependency resources. It is clearly documented that he was advised to stop drinking in 2011.   Mental Health: Jose does not report a history of any mental health concerns. He does not report any psychotherapy, psychiatric hospitalizations or suicidal thoughts.   Adjustment to Illness:  This writer provided Jose  with supportive counseling throughout this interview.    Impression/Recommendations:   Jose verbalizes understanding the psychosocial risks of transplant and teaching provided during this consultation   Jose has met the sobriety criteria " recommended for listing, however there is a discrepancy between his last reported use and the documented last reported use. It is recommended that he completes a substance use assessment and any treatment recommendations. He reported that he does not feel like he needs additional treatment, but understands that it is expected that pursue any recommendations. Routine Peth's are recommended.  Yoly would be his primary caregiver post transplantation   Jose has adequate finances and health insurance for transplant, and an intact support system.  It was a pleasure to evaluate this patient for liver transplant.   Teaching completed during assessment:  1.     Housing and relocation needs post transplant.  2.     Caregiver needs post transplant.  3.     Financial issues related to transplant.  4.     Risks of alcohol use post transplant.  5.     Common psychosocial stressors pre/post transplant.        6.     Liver Transplant support group availability.        7.     Advanced [employer] Directive - I reviewed a health care directive with him. He would like his friend, Yoly to be his primary health care agent. I sent a copy to his home.              Psychosocial Risks of Transplant Reviewed:  1.     Increased stress related to your emotional, family, social, employment, or   financial situation.  2.     Affect on work and/or disability benefits.  3.     Affect on future health and life insurance.  4.     Transplant outcome expectations may not be met.  5.     Mental Health risks: anxiety, depression, PTSD, guilt, grief and chronic fatigue.     RABIA Britton, JEREMIAH    Again, thank you for allowing me to participate in the care of your patient.        Sincerely,        JEREMIAH Saldivar

## 2024-01-15 NOTE — PROGRESS NOTES
Psychosocial Assessment - Consultation  Jose Dillon . Participated in a telephone consultation for potential liver transplantation.   Living Situation: Jose lives alone in a studio apartment in [place], [place]. He's resided there for the past four years. He indicated that they have three flights of stairs to enter their apartment. He has his friend, Yoly assist him in the apartment.   Education/Employment:  Jose completed two years of vocational school. He last worked about 15 years ago. He is not longer working and is considered disabled.  Jose is not a .   Financial /Income: Jose receives supplemental security income. He does not have any financial concerns.   Health Insurance:  Jose has [employer]. This writer talked with Jose about the financial risks of transplant, particularly about the high cost of transplant related medications and the importance of maintaining adequate health insurance coverage.  Family/Social Support: Jose is not  and has one son, Jose who lives in [place]. He has two living sisters, Ayleen and Becky who live in [place]. His father Darryl lives in [facility]. He reports a good relationship with his siblings and family, however they would not be able to be involved with his care post transplantation. Jose has a friend Yoly that assists with all his IADLS. She is in the process of becoming his official PCA.   His friend, Yoly would be his primary caregiver post transplantation. She was involved with the call, but further education should be provided if patient continues to transplant evaluation.   This writer stressed the importance of having a stable and involved support network before and after transplant.  Provided Jose  with education about the relationship between a stable support system and better surgical and post-transplant outcomes compared to patients with a limited support system.    Functional Status: Jose uses a cane to ambulate. His friend,  "Yoly assists with all other IADLs. He is not driving due to his DUIs. Both him and Yoly manage his medications.   Chemical Dependency:  Jose reports that his last consumption of alcohol was about a year ago, however there is reports of him taking a sip of alcohol on his birthday in May of 2022. He reports that prior to abstaining from alcohol, he was consuming a six pack of beer every four days. Per clinical, there are reports of him reporting six beers per day. He does not report a history of tobacco use, or misuse/over use of pain medication. Jose did not disclose any illicit substances, however per clinical he had reported some marijuana in the past.   Jose has a history of a \"few\" outpatient treatment programs. He could not provide a specific number. His treatment program ranged from 4-8 years ago. He also he a history of four DUIs, ranging from 1575-8941.   Today, Jose reported that he was diagnosed with liver disease one year ago, however per clinical it appears that he was diagnosed in 2011. At that time, he had a hospital admission for alcohol use and was provided chemical dependency resources. It is clearly documented that he was advised to stop drinking in 2011.   Mental Health: Jose does not report a history of any mental health concerns. He does not report any psychotherapy, psychiatric hospitalizations or suicidal thoughts.   Adjustment to Illness:  This writer provided Jose  with supportive counseling throughout this interview.    Impression/Recommendations:   Jose verbalizes understanding the psychosocial risks of transplant and teaching provided during this consultation   Jose has met the sobriety criteria recommended for listing, however there is a discrepancy between his last reported use and the documented last reported use. It is recommended that he completes a substance use assessment and any treatment recommendations. He reported that he does not feel like he needs additional " treatment, but understands that it is expected that pursue any recommendations. Routine Peth's are recommended.  Yoly would be his primary caregiver post transplantation   Jose has adequate finances and health insurance for transplant, and an intact support system.  It was a pleasure to evaluate this patient for liver transplant.   Teaching completed during assessment:  1.     Housing and relocation needs post transplant.  2.     Caregiver needs post transplant.  3.     Financial issues related to transplant.  4.     Risks of alcohol use post transplant.  5.     Common psychosocial stressors pre/post transplant.        6.     Liver Transplant support group availability.        7.     [employer] Directive - I reviewed a health care directive with him. He would like his friend, Yoly to be his primary health care agent. I sent a copy to his home.              Psychosocial Risks of Transplant Reviewed:  1.     Increased stress related to your emotional, family, social, employment, or   financial situation.  2.     Affect on work and/or disability benefits.  3.     Affect on future health and life insurance.  4.     Transplant outcome expectations may not be met.  5.     Mental Health risks: anxiety, depression, PTSD, guilt, grief and chronic fatigue.     Hailee Clark, RABIA, LICSW

## 2024-01-17 ENCOUNTER — TELEPHONE (OUTPATIENT)
Dept: GASTROENTEROLOGY | Facility: CLINIC | Age: 59
End: 2024-01-17
Payer: COMMERCIAL

## 2024-01-17 NOTE — TELEPHONE ENCOUNTER
Let pt know about upcoming appt with Dr. Miranda; pt to move forward with MD, not MEENA, per [facility]; slot approved per Katerina POSEY- NEEL 1.17.24//

## 2024-01-30 ENCOUNTER — HOSPITAL ENCOUNTER (OUTPATIENT)
Dept: BEHAVIORAL HEALTH | Facility: CLINIC | Age: 59
Discharge: HOME | End: 2024-01-30
Attending: INTERNAL MEDICINE | Admitting: INTERNAL MEDICINE
Payer: COMMERCIAL

## 2024-01-30 VITALS — HEIGHT: 69 IN | BODY MASS INDEX: 25.92 KG/M2 | WEIGHT: 175 LBS

## 2024-01-30 DIAGNOSIS — K70.31 ALCOHOLIC CIRRHOSIS OF LIVER WITH ASCITES (H): ICD-10-CM

## 2024-01-30 DIAGNOSIS — F10.21 ALCOHOL USE DISORDER, SEVERE, IN EARLY REMISSION (H): Primary | ICD-10-CM

## 2024-01-30 PROCEDURE — H0001 ALCOHOL AND/OR DRUG ASSESS: HCPCS

## 2024-01-30 RX ORDER — MULTIVITAMIN WITH IRON
1 TABLET ORAL DAILY
COMMUNITY

## 2024-01-30 ASSESSMENT — PATIENT HEALTH QUESTIONNAIRE - PHQ9: SUM OF ALL RESPONSES TO PHQ QUESTIONS 1-9: 2

## 2024-01-30 ASSESSMENT — ANXIETY QUESTIONNAIRES
2. NOT BEING ABLE TO STOP OR CONTROL WORRYING: NOT AT ALL
4. TROUBLE RELAXING: NOT AT ALL
5. BEING SO RESTLESS THAT IT IS HARD TO SIT STILL: NOT AT ALL
1. FEELING NERVOUS, ANXIOUS, OR ON EDGE: NOT AT ALL
6. BECOMING EASILY ANNOYED OR IRRITABLE: NOT AT ALL
7. FEELING AFRAID AS IF SOMETHING AWFUL MIGHT HAPPEN: NOT AT ALL
GAD7 TOTAL SCORE: 0
GAD7 TOTAL SCORE: 0
3. WORRYING TOO MUCH ABOUT DIFFERENT THINGS: NOT AT ALL

## 2024-01-30 ASSESSMENT — COLUMBIA-SUICIDE SEVERITY RATING SCALE - C-SSRS
TOTAL  NUMBER OF INTERRUPTED ATTEMPTS LIFETIME: NO
2. HAVE YOU ACTUALLY HAD ANY THOUGHTS OF KILLING YOURSELF?: NO
1. HAVE YOU WISHED YOU WERE DEAD OR WISHED YOU COULD GO TO SLEEP AND NOT WAKE UP?: NO
TOTAL  NUMBER OF ABORTED OR SELF INTERRUPTED ATTEMPTS LIFETIME: NO
ATTEMPT LIFETIME: NO
6. HAVE YOU EVER DONE ANYTHING, STARTED TO DO ANYTHING, OR PREPARED TO DO ANYTHING TO END YOUR LIFE?: NO

## 2024-01-30 ASSESSMENT — PAIN SCALES - GENERAL: PAINLEVEL: EXTREME PAIN (8)

## 2024-01-30 NOTE — PROGRESS NOTES
[facility]  Provider Name:  PETER Beckford/SHADY     Telephone: [phone]      PATIENT'S NAME: Jose Dillon .  PREFERRED NAME: Jose  PRONOUNS: he/him/his    MRN: [id number]  : 1965  ADDRESS:   [address] [place] [address]  E-MAIL: [email or web address]   ACCT. NUMBER:  [id number]  DATE OF SERVICE: 2024  START TIME: 9:30 am  END TIME: 10:35 am  PREFERRED PHONE: [phone]   May we leave a program related message: Yes  SERVICE MODALITY:  In-person:        Last 4 digits of SSN #: [id number]    EMERGENCY CONTACT:   Yoly Jules (girlfriend/significant other)  Tel #: [phone]    UNIVERSAL ADULT SUBSTANCE USE DISORDER DIAGNOSTIC ASSESSMENT    Identifying Information:  The patient is a 58 year old, / Black male.  The patient was referred for an assessment by [facility].  The patient attended the session alone.     Chief Complaint:   The reason for seeking services at this time is:  The reason for the substance use disorder assessment today on 2024 was because it was a part of the evaluation process with the [facility] for a potential liver transplant for this patient.  The patient was first diagnosed with having liver disease back in , but he did not develop cirrhosis of the liver with ascites until sometime in .  The patient reported having ascites requiring paracentesis around 1 time per week on average during the past 12+ months.  The patient reported he had continued to drink alcohol on an almost daily basis up until stopping his use of alcohol sometime in early .  The patient reported having a 1-day slip with alcohol in early 2023, when he reported drinking a few sips of beer.  The patient reported having a history of 4 DWI charges, with his last DWI charge being a gross misdemeanor DWI in [facility]  [facility] on 8/13/2020.  The patient also reported having a remote history of 3 public consumption tickets, 2 open bottle tickets and 1 domestic assault charge.  The patient denied having any other history of arrests or legal charges.  The patient reported being committed to continuing with life-long abstinence from alcohol and from all other non-prescribed mood altering chemicals.  The patient appeared to be willing to follow the recommendation to enter the ASA 1.0 substance use disorder Outpatient treatment program at one of the [facility] locations for substance use disorder treatment.  The patient does not have a vehicle, so there are potential transportation barriers for him attending an in-person treatment program, so he could be referred to the virtual [facility] 1.0 substance use disorder Outpatient treatment program at [facility] and [facility] as an alternative to entering the in-person ASA 1.0 substance use disorder Outpatient treatment programs at  [facility].  The patient first had a concern about having substance abuse issues in 2011.  The patient reported he had attempted to stop his use of alcohol by attending substance use disorder treatment in the past.  The patient reported participating in 2 prior substance use disorder treatment programs, with the last substance use disorder treatment program being residential treatment at [facility] in 2021.  The patient denied having any inpatient detoxification admissions or inpatient hospitalizations for withdrawal symptoms.  The patient is not currently receiving any substance use disorder treatment services.  The patient denied having any recent attendance at 12-step or other recovery support group meetings.  The patient does not appear to be in severe withdrawal, an imminent safety risk to self or others, or requiring immediate medical attention and may proceed with the assessment interview.    Social/Family History:  The patient reported growing up  in [facility] [facility] [place]  The patient reported being raised by both of his biological parents in the same family home.  The patient denied experiencing or witnessing any verbal, physical or sexual abuse when he was growing up in the family home.  The patient reported overall his childhood was happy.  The patient reported feeling supported by all of his family members when he was growing up.  The patient reported being raised in the [employer] ([employer]).  The patient described his current relationships with his family of origin as being good.      The patient describes his cultural background as being an / Black male.  Cultural influences and impact on patient's life structure, values, norms, and healthcare: The patient denied cultural concerns had an impact on life structure, values, norms, or healthcare.  Contextual influences on patient's health include: Family Factors: family history includes Alcoholism in his brother and maternal uncle; Substance Abuse in his brother and maternal uncle.  The patient identified his preferred language to be English.  The patient reported he does not need the assistance of an  or other support involved in therapy.  The patient reported he is rarely involved in community suma activities.  The patient reported his spirituality would have a positive impact on his recovery.    The patient reported experiencing significant delays in developmental tasks, such as having problems with vision impairment.  The patient's highest education level was associate degree / vocational certificate.  The patient identified the following learning problems: visually impaired.  The patient reports he is able to understand written materials.    The patient reported the following relationship history: The patient reported being  for 2-years until being  in late 2000.  The patient identified as being heterosexual and he reported being in a serious romantic  relationship with his olamide for the past 15 years.  The patient reported having 1 son age 31.     The patient's current living/housing situation involves staying in own home/apartment.  The patient reported living alone and he reported his housing is stable.  The patient denied having any concerns regarding his immediate living environment and/or neighborhood and he plans to continue to live there.  The patient identified his fiancee, his medical providers and his PCA as being his primary support network at this time.  The patient identified the quality of his relationships with his support network as being good.  The patient would like the following people involved in treatment services if recommended: None at this time.     The patient reported engaging in the following recreational/leisure activities: watching TV and cleaning his apartment.  The patient reported engaging in the following recreation/leisure activities while using alcohol or other non-prescribed mood altering chemicals: The patient's use of alcohol had been done independently of his social/recreational/leisure activities.  The patient reported the following people are supportive of his recovery: his fiancee, his family members, his medical providers and his PCA.  The patient reported being disabled for the past 15 years and had been unable to work for the past 15 years.  The patient reported his income is obtained through [employer] disability.  The patient denied having any financial stressors at this time.  Cultural and socioeconomic factors do not affect the patient's access to services.    The patient reported the following substance related arrests or legal issues: The patient reported having a history of 4 DWI charges, with his last DWI charge being a gross misdemeanor DWI in [facility] on 8/13/2020.  The patient also reported having a remote history of 3 public consumption tickets, 2 open bottle tickets and 1 domestic assault charge.  The  patient denied having any other history of arrests or legal charges.  The patient reported currently being on court probation in [facility] for his gross misdemeanor on 8/13/2020.    Patient's Strengths and Limitations:  The patient identified the following strengths or resources that will help him succeed in treatment: commitment to health and well being, suam / spirituality, and motivation.  Things that may interfere with the patient's success in treatment include: lack of a sober peer support network and physical health concerns.     Assessments:  The following assessments were completed by patient for this visit:  PHQ9:       1/30/2024     9:00 AM   PHQ-9 SCORE   PHQ-9 Total Score 2     GAD7:       1/30/2024     9:00 AM   MERNA-7 SCORE   Total Score 0     PROMIS 10-Global Health (all questions and answers displayed):       1/30/2024     9:00 AM   PROMIS 10   In general, would you say your health is: 1   In general, would you say your quality of life is: 5   In general, how would you rate your physical health? 2   In general, how would you rate your mental health, including your mood and your ability to think? 5   In general, how would you rate your satisfaction with your social activities and relationships? 4   In general, please rate how well you carry out your usual social activities and roles. (This includes activities at home, at work and in your community, and responsibilities as a parent, child, spouse, employee, friend, etc.) 4   To what extent are you able to carry out your everyday physical activities such as walking, climbing stairs, carrying groceries, or moving a chair? 2   In the past 7 days, how often have you been bothered by emotional problems such as feeling anxious, depressed, or irritable? 2   In the past 7 days, how would you rate your fatigue on average? 2   In the past 7 days, how would you rate your pain on average, where 0 means no pain, and 10 means worst imaginable pain? 8   Global  Mental Health Score 18   Global Physical Health Score 10   PROMIS TOTAL - SUBSCORES 28     [place] Suicide Severity Rating Scale (Lifetime/Recent)      1/30/2024     9:00 AM   [place] Suicide Severity Rating (Lifetime/Recent)   Q1 Wish to be Dead (Lifetime) N   Q2 Non-Specific Active Suicidal Thoughts (Lifetime) N   Actual Attempt (Lifetime) N   Has subject engaged in non-suicidal self-injurious behavior? (Lifetime) N   Interrupted Attempts (Lifetime) N   Aborted or Self-Interrupted Attempt (Lifetime) N   Preparatory Acts or Behavior (Lifetime) N   Calculated C-SSRS Risk Score (Lifetime/Recent) No Risk Indicated     GAIN-sliding scale:      1/30/2024     9:00 AM   When was the last time that you had significant problems...   with feeling very trapped, lonely, sad, blue, depressed or hopeless about the future? Never   with sleep trouble, such as bad dreams, sleeping restlessly, or falling asleep during the day? Never   with feeling very anxious, nervous, tense, scared, panicked or like something bad was going to happen? Never   with becoming very distressed & upset when something reminded you of the past? Never   with thinking about ending your life or committing suicide? Never          1/30/2024     9:00 AM   When was the last time that you did the following things 2 or more times?   Lied or conned to get things you wanted or to avoid having to do something? Never   Had a hard time paying attention at school, work or home? Never   Had a hard time listening to instructions at school, work or home? Never   Were a bully or threatened other people? Never   Started physical fights with other people? Never     Personal and Family Medical History:  The patient did not report a family history of mental health concerns.  The patient reported family history includes Alcoholism in his brother and maternal uncle; Substance Abuse in his brother and maternal uncle.     The patient reported the following previous mental health  diagnoses: The patient denied having any history of being diagnosed with a clinical mental health disorder.   The patient reported his primary mental health symptoms include: The patient denied having any history of mental health symptoms and these do not impact his ability to function.  The patient has not received mental health services in the past: The patient denied having any history of being prescribed psychotropic medications.  The patient denied having any history of working with a 1:1 mental health therapist.  Psychiatric Hospitalizations: None.  The patient denies a history of civil commitment.  Current mental health services/providers include:  The patient denied having any history of being prescribed psychotropic medications.  The patient denied having any history of working with a 1:1 mental health therapist.    The patient has had a physical exam to rule out medical causes for current symptoms.  Date of last physical exam was within the past year. The patient was encouraged to follow up with PCP if symptoms were to develop.  The patient has a [facility] Primary Care Provider, who is named Jeb Lawrence.  The patient reported the following medical concerns:   Past Medical History:   Diagnosis Date    Alcohol use disorder     Alcoholic cirrhosis of liver with ascites (H)     Hiatal hernia     Madelung's disease (H)    The patient reported taking his medications as prescribed and following the recommendations of his healthcare providers.  The patient reported pain concerns including having pain from a hernia.  The patient did not feel there was any need for additional help addressing this pain concerns.  The patient is male and is not pregnant.  There are not significant appetite / nutritional concerns / weight changes.  The patient does not report having a history of an eating disorder.  The patient does not report a history of head injury / trauma / cognitive impairment.      The patient reported current  medications as:   Outpatient Medications Marked as Taking for the 1/30/24 encounter (Hospital Encounter) with Darci Chang LADC   Medication Sig    lactulose (CHRONULAC) 10 GM/15ML solution Take 20 g by mouth 3 times daily    magnesium 250 MG tablet Take 1 tablet by mouth daily    rifaximin (XIFAXAN) 550 MG TABS tablet Take 200 mg by mouth    vitamin D3 (CHOLECALCIFEROL) 50 mcg (2000 units) tablet Take 1 tablet by mouth daily     Medication Adherence:  The patient reported taking his prescribed medications as prescribed.  The patient reported being able  to self-administer his medications.    Patient Allergies:    Allergies   Allergen Reactions    Ace Inhibitors Cough     Other reaction(s): Cough  Dry cough that resolved with stopping lisinopril       Medical History:    Past Medical History:   Diagnosis Date    Alcohol use disorder     Alcoholic cirrhosis of liver with ascites (H)     Hiatal hernia     Madelung's disease (H)       Substance Use:  The patient reported the following biological family members or relatives with chemical health issues: family history includes Alcoholism in his brother and maternal uncle; Substance Abuse in his brother and maternal uncle.  The patient reported participating in 2 prior substance use disorder treatment programs, with the last substance use disorder treatment program being residential treatment at [facility] in 2021.  The patient denied having any inpatient detoxification admissions or inpatient hospitalizations for withdrawal symptoms.  The patient is not currently receiving any substance use disorder treatment services.  The patient denied having any recent attendance at 12-step or other recovery support group meetings.      Substance Age of first use Pattern and duration of use (include amounts and frequency) Date of last use     Withdrawal potential Route of use   Has used Alcohol 25 The patient reported his heaviest use of alcohol had been between 36 and 51, when  he reported a pattern of drinking 3-6 beers and a few shots of hard alcohol on an almost daily basis.    The patient reported being having no use of alcohol for 12+ months, prior to having a 1-day slip with alcohol in early 5/2023.    The patient denied having any use of alcohol since 5/2023.   Early 5/2023    (2 sips) No Oral   Has used Marijuana   24 The patient reported his heaviest use of THC/cannabis had been between the ages of 24 and 27, when he reported a pattern of drinking few hits of THC/cannabis on a daily basis.   2020 No Smoke   Has not used Amphetamines          Has not used Cocaine/crack           Has not used Hallucinogens        Has not used Inhalants        Has not used Heroin        Has not used Other Opiates        Has not used Benzodiazepines          Has not used Barbiturates        Has not used Over the counter medications        Has not used Nicotine        Has use Caffeine 15 The patient reported a current pattern of drinking 1 bottle/can of soda around 1 time per month on average.    1/10/2024  No  Oral   Has used other substances not listed above:  Identify:           The patient reported the following problems as a result of their substance use: relationship problems, family problems, legal issues, DUI, and chronic health problems which were exacerbated by his use of alcohol.  The patient is not concerned about substance use.  The patient reported his recovery goal is: The patient's plan and goal is to continue to abstain from alcohol and from all other non-prescribed mood altering chemicals.     The patient reports experiencing the following withdrawal symptoms within the past 12 months: none within the past 12-months and the following within the past 30 days: none within the past 12-months. (DSM-11)  The patient reported having urges to use alcohol.  (DSM-4)  The patient reported he has not used more alcohol than intended or over a longer period of time than intended within the past  12-months.  (DSM-1)  The patient reported he has had unsuccessful attempts to cut down or control use of alcohol.  (DSM-2)  The patient reported his longest period of abstinence had been since early 6/2023.  The patient reported he has needed to use more alcohol to achieve the same effect.  (DSM-10)  The patient does report diminished effect with use of same amount of alcohol.  (DSM-10)     The patient does not report a great deal of time is spent in activities necessary to obtain, use, or recover from alcohol effects within the past 12-months.  (DSM-3)  The patient does not report important social, occupational, or recreational activities are given up or reduced because of alcohol use.  (DSM-7)  Alcohol use is continued despite knowledge of having a persistent or recurrent physical or psychological problem that is likely to have been caused or exacerbated by use.  (DSM-9)  The patient reported the following problem behaviors while under the influence of substances: None within the past 12-months.  (DSM-6)  The patient denied having any recurrent use of alcohol in physically hazardous situations within the past 12-months.  (DSM-8)    The patient reported his substance use has not negatively impacted his ability to function in a school setting within the past 12-months.  (DSM-5)  The patient reported his substance use has not negatively impacted his ability to function in a work setting within the past 12-months.  (DSM-5)  The patient's demographics and history impact his recovery in the following ways: family history includes Alcoholism in his brother and maternal uncle; Substance Abuse in his brother and maternal uncle.  The patient reported engaging in the following recreation/leisure activities while using alcohol or other non-prescribed mood altering chemicals: The patient's use of alcohol had been done independently of his social/recreational/leisure activities.  The patient reported the following people are  supportive of his recovery: his fiancee, his family members, his medical providers and his PCA.    The patient denied having current or past concerns regarding gambling and denied ever participating in a gambling treatment program.  The patient does not have other addictive behaviors he is concerned about at this time.    Dimension Scale Ratings:    Dimension 1 -  Acute Intoxication/Withdrawal: 0 - No Problem    Dimension 2 - Biomedical: 3 - Severe Problem    Dimension 3 - Emotional/Behavioral/Cognitive Conditions: 0 - No Problem    Dimension 4 - Readiness to Change:  1 - Minor Problem    Dimension 5 - Relapse/Continued Use/ Continued Problem Potential: 2 - Moderate Problem    Dimension 6 - Recovery Environment:  1 - Minor Problem    Significant Losses / Trauma / Abuse / Neglect Issues:   The patient did not serve in the .  There are indications or report of significant loss, trauma, abuse or neglect issues related to: The patient denied having any history of being verbally, emotionally, physically, or sexually abused.  The patient reported having a history of trauma issues due to his mother dying due to having multiple health issues in 2023 and he couldn't attend the  due to being in the hospital at that time.  The patient denied having any history of suicide attempts and denied having any current suicide ideation.  The patient denied having any history of self-injurious behavior.   Concerns for possible neglect are not present.    Safety Assessment:   The patient denies current homicidal ideation and behaviors.  The patient denies current self-injurious ideation and behaviors.    The patient reported having health problems associated with substance use.  The patient denied any high risk behaviors associated with mental health symptoms.  The patient reported the following current concerns for their personal safety: None.  The patient reported there are not any firearms in the home.     History of  Safety Concerns:  The patient denied a history of homicidal ideation.     The patient denied a history of personal safety concerns.    The patient denied a history of assaultive behaviors.    The patient denied having any history of sexual assault behaviors.  The patient denied having any history of being registered as a sex offender.  The patient reported a history of unsafe motor vehicle operation, reported a history of having legal consequences, and reported a history of having health problems associated with substance use.  The patient denies any history of high risk behaviors associated with mental health symptoms.  The patient reported the following protective factors: spirituality, positive relationships positive family connections, forward/future oriented thinking, dedication to family/friends, safe and stable environment, abstinence from substances, adherence with prescribed medication, and commitment to well-being.    Risk Plan:  See Recommendations for Safety and Risk Management Plan    Review of Symptoms per patient report:  Substance Use:  substance use related medical issues.    Diagnostic Criteria:   2.)  There is persistent desire or unsuccessful efforts to cut down or control use of the substance.  Met for:  alcohol.  4.)  Craving, or a strong desire or urge to use the substance.  Met for:  alcohol.  9.)  Use of the substance is continued despite knowledge of having a persistent or recurrent physical or psychological problem that is likely to have been cause or exacerbated by the substance.  Met for:  alcohol.  10.)  Tolerance:  either a need for markedly increased amounts of the substance to achieve the desired effect or a markedly diminished effect with continued use of the same amount of the substance.  Met for:  alcohol.    Collateral Contact Summary:   Collateral contacts contributing to this assessment:  The patient's electronic medical records were reviewed at time of assessment.    No  additional collateral data had been obtained at the time of this documentation.     If court related records were reviewed, summarize here: None    Information from collateral contacts supported/largely agreed with information from the client and associated risk ratings.    Information in this assessment was obtained from the medical record and provided by the patient who is a fair historian.        The patient will have open access to his substance use disorder assessment medical record.    As evidenced by self report and criteria, the patient meets the following DSM-5 Diagnoses: (Sustained by DSM-5 Criteria Listed Above)      1.)  Alcohol Use Disorder Severe - 303.90 (F10.20), in early remission    Specify if: In early remission:  After full criteria for alcohol/drug use disorder were previously met, none of the criteria for alcohol/drug use disorder have been met for at least 3 months but for less than 12 months (with the exception that Criterion A4,  Craving or a strong desire or urge to use alcohol/drug  may be met).     In sustained remission:   After full criteria for alcohol use disorder were previously met, none of the criteria for alcohol/drug use disorder have been met at any time during a period of 12 months or longer (with the exception that Criterion A4,  Craving or strong desire or urge to use alcohol/drug  may be met).     Specify if:   This additional specifier is used if the individual is in an environment where access to alcohol is restricted.    Mild: Presence of 2-3 symptoms  Moderate: Presence of 4-5 symptoms  Severe: Presence of 6 or more symptoms    Recommendations:     1. Plan for Safety and Risk Management:     Recommended that patient call 911 or go to the local ED should there be a change in any of these risk factors..            Report to child / adult protection services was not needed.    2. AVE Referrals:      Recommendations:      1.)  It was recommended the patient continue to  abstain from alcohol and from all other non-prescribed mood altering chemicals.   2.)  Follow all of the recommendations of his healthcare providers.  3.)  Follow all of the terms and conditions of working with the [facility] Liver Transplant Team.  4.)  Participate in random alcohol and drug screening to ensure compliance with abstinence from alcohol and from all other non-prescribed mood altering chemicals.  5.)  Enter the [facility] 1.0 substance use disorder Outpatient treatment program at one of the [facility] locations.  The patient does not have a vehicle, so there are potential transportation barriers for him attending an in-person treatment program, so he could be referred to the virtual [facility].0 substance use disorder Outpatient treatment program at [facility] and [facility] as an alternative to entering the in-person [facility]. substance use disorder Outpatient treatment programs at  [facility].  6.)  Attend liver transplant support group meetings and/or other support group meetings as he was able.     The patient reported he was willing to follow the above recommendations.      The patient would like the following family or other support people involved in their treatment:  None at this time.  The patient does not have any history of opioid abuse.      3.  Mental Health Referrals:     The patient did not appear to be in any need of a mental health referral at this time.    4. Clinical Substantiation for the above recommendations: The patient would benefit from continuing to long-term sober maintenance skills, continuing to develop sober coping skills, he lacks a current sober peer support network, and has medical issues which were exacerbated by substance abuse.    5.  Cultural Concerns:    The patient did not identify having any cultural concerns regarding mental health, physical health, or substance use issues.     6. Recommendations for treatment focus:      Alcohol / Substance Use - See #2.  AVE Referrals above for details on recommendations.    7. Interactive Complexity: No    8. Safety Plan:  Patient denied any current/recent/lifetime history of suicidal ideation and/or behaviors.  No safety plan indicated at this time.     Provider Name/ Credentials:  SHADY Beckford  January 30, 2024

## 2024-02-02 ENCOUNTER — TELEPHONE (OUTPATIENT)
Dept: TRANSPLANT | Facility: CLINIC | Age: 59
End: 2024-02-02
Payer: COMMERCIAL

## 2024-02-02 NOTE — TELEPHONE ENCOUNTER
Transplant Social Work Services Phone Call    Data: Jose participate in a consultation for liver transplant. He was recommended to complete a substance use assessment as part of his consultation. This was scheduled for 1/30/2024. He was recommended to engage in a 1.0 level of care through [facility]. Referral was made, and note indicated that [facility] will call patient directly. Call to patient to review recommendations and expectations for transplant. Voice message left.   Intervention: deferred  Assessment: deferred  Education provided by SW: deferred  Plan: Waiting for a return call      RABIA Britton, [facility]  Liver Transplant   [facility]  Phone: [phone]

## 2024-02-15 ENCOUNTER — TELEPHONE (OUTPATIENT)
Dept: GASTROENTEROLOGY | Facility: CLINIC | Age: 59
End: 2024-02-15
Payer: COMMERCIAL

## 2024-03-28 DIAGNOSIS — K70.31 ALCOHOLIC CIRRHOSIS OF LIVER WITH ASCITES (H): Primary | ICD-10-CM

## 2024-03-28 DIAGNOSIS — F10.21 ALCOHOL USE DISORDER, SEVERE, IN EARLY REMISSION (H): ICD-10-CM

## 2024-04-18 ENCOUNTER — TELEPHONE (OUTPATIENT)
Dept: TRANSPLANT | Facility: CLINIC | Age: 59
End: 2024-04-18
Payer: COMMERCIAL

## 2024-04-18 NOTE — TELEPHONE ENCOUNTER
Transplant Social Work Services Phone Call    Data: Jose participate in a consultation for transplant on 1/15/2024.   Intervention: I received a call from Jose asking to schedule an appointment. I explained that I do not schedule appointments, but am able to direct him to the right department if he would like to schedule something. He reported that he would like to talk to Danika Segovia. I indicated that I will send a message to her. He inquired why I would need to talk to him.   I reviewed substance use assessment and treatment recommendations of a 1.0 program. Jose indicated that he was not going to do that program, and doesn't need it. I explained to him that this is an expectation of our transplant center and there are no exceptions to completing this program. Jose insisted that he did not need this program and would not do it. I had a long discussion with patient that he would not be considered for transplant without his engagement in this program. He voiced understanding and disagreed with this. He asked who he needed to talk to get around this, and I reported that I was his person for the substance use portion of transplantation here. He will call this writer if he is ready to engage in programming.   Assessment: Jose is not agreeable to substance use treatment  Education provided by : See above  Plan: Jose has my direct phone number. Message forwarded to RABIA Nicolas, [facility]  Liver Transplant   [facility]  Phone: [phone]

## 2024-06-10 ENCOUNTER — TELEPHONE (OUTPATIENT)
Dept: TRANSPLANT | Facility: CLINIC | Age: 59
End: 2024-06-10
Payer: COMMERCIAL

## 2024-06-10 NOTE — TELEPHONE ENCOUNTER
Transplant Social Work Services Phone Call    Data: Mr. Dillon participated in a telephone consultation with this writer 1/30/2024. At that time he completed a substance use assessment and was recommended to engage in a 1.0 level of care. He declined to engage at that time.   Intervention: He called this writer today to inquire how to get re-engaged in the program. I provided information on how to get re-engaged in program. We called [facility] together and he is scheduled for the following appointments  1.0 level of care  6/17/2024 - 9-10 am virtual orientation with Sofi - Dinesh was sent to his e-mail  6/18/2024 - 8-12 group and 2-3 individual session  My Santana reported that his last consumption of alcohol was March 8, 2024 and received a DUI in [facility]. He does not report other consumption of alcohol after that time. He has his next court date on July 17th. At this time he has a portable breathalyzer 3x per day, and is hoping he will be off probation at his next court date.     Assessment: For transplantation, it is recommended that he completes the substance use treatment program, and have a longer period of sobriety to be considered for transplantation given he was well aware of his health condition and continued to consume alcohol.   Education provided by MEHUL: See above.   Plan: Message to Danika francois: consultation patient.      RABIA Britton, [facility]  Liver Transplant   [facility]  Phone: [phone]

## 2024-07-02 ENCOUNTER — TELEPHONE (OUTPATIENT)
Dept: TRANSPLANT | Facility: CLINIC | Age: 59
End: 2024-07-02

## 2024-07-02 NOTE — TELEPHONE ENCOUNTER
Patient Call: General  Route to LPN    Reason for call: Patient needs to touch base and follow up ASAP. Patient is trying to get in touch with staff who assisted in setting up liver transplant classes and orientation. More details with call back.     Call back needed? Yes    Return Call Needed  Same as documented in contacts section  When to return call?: Same day: Route High Priority

## 2024-07-10 ENCOUNTER — TELEPHONE (OUTPATIENT)
Dept: TRANSPLANT | Facility: CLINIC | Age: 59
End: 2024-07-10
Payer: COMMERCIAL

## 2024-07-10 NOTE — PROGRESS NOTES
Transplant Social Work Services Phone Call      Data: Jose's initial consultation/psychosocial assessment for liver transplant took place on 1/15/2024. He was recommended to engage in the 1.0 level of care for substance use treatment and had plans to start with [facility] in June 2024. Writer received referral to contact Jose, as he called with questions about getting started with treatment.   Intervention: Writer spoke with Jose via telephone.  Assessment: Jose reports he previously was unable to start treatment because he did not have the meeting ID for his groups. However, he reports he had his orientation with [facility], yesterday 7/9/2024, and starts treatment today 7/10/2024. Writer requested Jose provide Sofi with writer's phone number and fax number and request she send writer an update of his engagement in programming.   Education provided by MEHUL: no new information.   Plan:Jose plans to provide Sofi ([facility]) with writers contact information to send update. SW to continue to follow Jose's progress in treatment.     Liver transplant  will remain available for psychosocial support.     RABIA Marie, [facility]  Liver Transplant   [email or web address]  Phone: [phone]  Fax: [phone]

## 2025-04-01 ENCOUNTER — REFERRAL (OUTPATIENT)
Dept: TRANSPLANT | Facility: CLINIC | Age: 60
End: 2025-04-01
Payer: COMMERCIAL

## 2025-04-01 DIAGNOSIS — K70.31 ALCOHOLIC CIRRHOSIS OF LIVER WITH ASCITES (H): Primary | ICD-10-CM

## 2025-04-01 PROBLEM — M79.651 BILATERAL THIGH PAIN: Status: ACTIVE | Noted: 2022-04-21

## 2025-04-01 PROBLEM — I20.89 STABLE ANGINA: Status: ACTIVE | Noted: 2021-03-18

## 2025-04-01 PROBLEM — K72.90 DECOMPENSATION OF CIRRHOSIS OF LIVER (H): Status: ACTIVE | Noted: 2021-10-29

## 2025-04-01 PROBLEM — K22.10 ESOPHAGEAL ULCER: Status: ACTIVE | Noted: 2024-02-09

## 2025-04-01 PROBLEM — M87.052 AVASCULAR NECROSIS OF BONE OF LEFT HIP (H): Status: ACTIVE | Noted: 2024-02-08

## 2025-04-01 PROBLEM — M54.50 LOW BACK PAIN: Status: ACTIVE | Noted: 2023-07-25

## 2025-04-01 PROBLEM — R53.81 PHYSICAL DECONDITIONING: Status: ACTIVE | Noted: 2022-08-18

## 2025-04-01 PROBLEM — S31.109A OPEN WOUND OF ABDOMEN: Status: ACTIVE | Noted: 2024-02-08

## 2025-04-01 PROBLEM — Z86.19 HISTORY OF SPONTANEOUS BACTERIAL PERITONITIS: Status: ACTIVE | Noted: 2024-02-08

## 2025-04-01 PROBLEM — R41.82 ALTERED MENTAL STATUS: Status: ACTIVE | Noted: 2024-04-03

## 2025-04-01 PROBLEM — K76.82 HEPATIC ENCEPHALOPATHY (H): Status: ACTIVE | Noted: 2022-09-01

## 2025-04-01 PROBLEM — M79.652 BILATERAL THIGH PAIN: Status: ACTIVE | Noted: 2022-04-21

## 2025-04-01 PROBLEM — I85.10 ESOPHAGEAL VARICES IN CIRRHOSIS (H): Status: ACTIVE | Noted: 2024-02-08

## 2025-04-01 PROBLEM — K74.60 DECOMPENSATION OF CIRRHOSIS OF LIVER (H): Status: ACTIVE | Noted: 2021-10-29

## 2025-04-01 PROBLEM — S06.5XAA SUBDURAL HEMATOMA (H): Status: ACTIVE | Noted: 2024-03-22

## 2025-04-01 PROBLEM — K74.60 ESOPHAGEAL VARICES IN CIRRHOSIS (H): Status: ACTIVE | Noted: 2024-02-08

## 2025-04-01 PROBLEM — K92.2 UGIB (UPPER GASTROINTESTINAL BLEED): Status: ACTIVE | Noted: 2023-05-17

## 2025-04-01 PROBLEM — A41.9 SEPSIS ASSOCIATED HYPOTENSION (H): Status: ACTIVE | Noted: 2023-11-16

## 2025-04-01 PROBLEM — K43.9 VENTRAL HERNIA WITHOUT OBSTRUCTION OR GANGRENE: Status: ACTIVE | Noted: 2022-11-23

## 2025-04-01 PROBLEM — K76.6 PORTAL HYPERTENSION (H): Status: ACTIVE | Noted: 2024-02-08

## 2025-04-01 PROBLEM — I95.9 SEPSIS ASSOCIATED HYPOTENSION (H): Status: ACTIVE | Noted: 2023-11-16

## 2025-04-01 NOTE — LETTER
April 2, 2025    Jose Dillon Sr.  [address]  [address]  [place] [address]    Dear Jose,    Thank you for your interest in the Transplant Center at [facility]. We look forward to being a part of your care team and assisting you through the transplant process.    As we discussed, your transplant coordinator is Caitlin Segovia (Liver).  You may call your coordinator at any time with questions or concerns.  Your first scheduled call will be on 4/7/2025 between 10:00 am-1:00 pm.  If this needs to change, call [phone].    Please complete the following.    Fill out and return the enclosed forms  Authorization for Electronic Communication  Authorization to Discuss Protected Health Information  Authorization for Release of Protected Health Information    Sign up for:  [employer], access to your electronic medical record (see enclosed pamphlet)  [employer], a transplant education website    You can use these tools to learn more about your transplant, communicate with your care team, and track your medical details       My Transplant Place    Sincerely,    [facility]  Solid Organ Transplant Care Programs  [phone], Option 5    cc: Referring Physician

## 2025-04-01 NOTE — TELEPHONE ENCOUNTER
Patient was asked the following questions during liver intake call.     SOT LIVER INTAKE     PCP: Dr. Elvis Lawrence [facility]  Referring Provider: Dr. Luis Dumont  Other Specialities: Gastro  Referring Diagnosis: Alcohol related cirrhosis       Insurance information: [employer] pt doesn't have a card per pt  1) Do you know why you have liver disease: Yes due to Cirrhosis      When was your last drink: 3/8/2024      Have you ever been through treatment for alcohol: Yes  2) Presence of Ascites: Yes Paracentesis: Yes  3) Presence of Hepatic Encephalopathy: No  Medications: No  4) History of GI Bleeding: No  5) Oxygen Use: No  6) EGD: Last done 2/8/2024 [facility] Care[facility]where  7) Colonoscopy: 11/18/2022 [facility] CareEverywhere  8) MELD Score: 13  9) Labs available for review from PCP/GI: CareEverywhere    10) HCC Diagnosis: No (if no, go to #11)          Abdominal CT:           MRI:          Bone Scan:           PET:           Chest CT:           Treatment Notes w/ Images:                                 Referral intake process completed.  Patient is aware that after financial approval is received, medical records will be requested.   Patient confirmed for a callback from transplant coordinator on 4/7/2025.  Tentative evaluation date TBD.     Confirmed coordinator will discuss evaluation process in more detail at the time of their call.   Patient is aware of the need to arrange age appropriate cancer screening, vaccinations, and dental care.  Reminded patient to complete questionnaire, complete medical records release, and review packet prior to evaluation visit .  Assessed patient for special needs (ie--wheelchair, assistance, guardian, and ):   Blind in right eye  Patient instructed to call [phone] with questions.      Patient gave verbal consent during intake call to obtain medical records and documents outside of [facility]: Yes  Patient agrees to DocuSign: Yes

## 2025-04-01 NOTE — LETTER
April 8, 2025      Jose Dillon Sr.  [address]  [address]  [place] [address]        Dear Mr. Dillon,      We were scheduled to complete a referral call with transplant coordinator on 4/7/25.  We were unable to reach you.  If you are interested in pursuing a consultation with one of our providers please contact me at the number below.          Sincerely,       Danika Segovia RN, BSN  Pre-Liver Transplant Coordinator  Phone: [phone]       CC:Referring provider

## 2025-04-01 NOTE — TELEPHONE ENCOUNTER
Re-opening liver evaluation.    Referring is STEFANO Nguyễn at [facility]  Alcohol related cirrhosis   PCP Elvis Lawrence    Ref 120 order in (signed by JT, but she is not assigned hep as of 4/1/25)  Txp episode #3 started  Sent to intake, PFR, and liver coordinator    Patient now sober almost a year and has done treatment thru [facility] (please request discharge report).     Below is note from referring in CE.      Decompensation of cirrhosis of liver (CMS/[facility])  Decompensated cirrhosis 2/2 alcohol use complicated by SBP and leaking abdominal wall hernia. Labs last checked 2/4/25. Bili is climbing. MELD 3.0 is 16. Previously seen by Opal AGARWAL at [facility] transplant.Was doing better at that time and she did not feel he would be eligible for transplant. He has completed alcohol treatment at [facility] and motivated to remain sober. MELD has increased to 16 again. Will see if Mary can see him and reassess.

## 2025-04-02 VITALS — HEIGHT: 69 IN | BODY MASS INDEX: 29.62 KG/M2 | WEIGHT: 200 LBS

## 2025-04-02 RX ORDER — FUROSEMIDE 20 MG/1
20 TABLET ORAL DAILY
COMMUNITY
Start: 2024-02-10

## 2025-04-02 RX ORDER — MULTIVIT-MIN/FA/LYCOPEN/LUTEIN .4-300-25
1 TABLET ORAL DAILY
COMMUNITY
Start: 2025-02-05

## 2025-04-02 RX ORDER — GABAPENTIN 300 MG/1
300 CAPSULE ORAL 3 TIMES DAILY
COMMUNITY
Start: 2024-06-20

## 2025-04-02 RX ORDER — FERROUS SULFATE 325(65) MG
1 TABLET ORAL DAILY
COMMUNITY
Start: 2025-02-04

## 2025-04-02 RX ORDER — PANTOPRAZOLE SODIUM 40 MG/1
40 TABLET, DELAYED RELEASE ORAL
COMMUNITY
Start: 2024-02-10

## 2025-04-02 RX ORDER — MAGNESIUM OXIDE 400 MG/1
400 TABLET ORAL
COMMUNITY
Start: 2024-02-10

## 2025-04-02 RX ORDER — SPIRONOLACTONE 50 MG/1
50 TABLET, FILM COATED ORAL DAILY
COMMUNITY
Start: 2024-02-11

## 2025-04-02 RX ORDER — LEVETIRACETAM 500 MG/1
500 TABLET ORAL
COMMUNITY
Start: 2024-03-24

## 2025-04-02 RX ORDER — ACETAMINOPHEN 500 MG
500 TABLET ORAL
COMMUNITY
Start: 2024-10-21

## 2025-04-07 NOTE — TELEPHONE ENCOUNTER
Patient scheduled for referral call between 10a-1p today.  Called at 1140 am, no answer- lvm, direct number provided    Will re-attempt to reach pt tomorrow if no call back

## 2025-04-08 NOTE — TELEPHONE ENCOUNTER
Spoke with Tamanna LEON at [facility],  referring provider Luis Dumont    Updated them that PETh resulted at 462 from last week. She will update Luis Dumont    Updated that we were unable to reach patient yesterday, but will send letter.  If patient calls, will offer consult and SW consult but mot a candidate at this time.      Referral closed at this time.